# Patient Record
Sex: FEMALE | Race: WHITE | NOT HISPANIC OR LATINO | Employment: UNEMPLOYED | URBAN - METROPOLITAN AREA
[De-identification: names, ages, dates, MRNs, and addresses within clinical notes are randomized per-mention and may not be internally consistent; named-entity substitution may affect disease eponyms.]

---

## 2023-12-26 ENCOUNTER — HOSPITAL ENCOUNTER (EMERGENCY)
Facility: HOSPITAL | Age: 78
Discharge: HOME | End: 2023-12-27
Attending: EMERGENCY MEDICINE
Payer: MEDICARE

## 2023-12-26 DIAGNOSIS — M54.31 RIGHT SIDED SCIATICA: Primary | ICD-10-CM

## 2023-12-26 PROCEDURE — 99284 EMERGENCY DEPT VISIT MOD MDM: CPT

## 2023-12-27 VITALS
WEIGHT: 250 LBS | HEIGHT: 66 IN | TEMPERATURE: 98 F | BODY MASS INDEX: 40.18 KG/M2 | SYSTOLIC BLOOD PRESSURE: 169 MMHG | OXYGEN SATURATION: 95 % | HEART RATE: 95 BPM | RESPIRATION RATE: 17 BRPM | DIASTOLIC BLOOD PRESSURE: 94 MMHG

## 2023-12-27 PROCEDURE — 96372 THER/PROPH/DIAG INJ SC/IM: CPT | Performed by: PHYSICIAN ASSISTANT

## 2023-12-27 PROCEDURE — 63600175 PHARM REV CODE 636 W HCPCS: Performed by: PHYSICIAN ASSISTANT

## 2023-12-27 PROCEDURE — 25000003 PHARM REV CODE 250: Performed by: PHYSICIAN ASSISTANT

## 2023-12-27 RX ORDER — LIDOCAINE 50 MG/G
1 PATCH TOPICAL
Status: DISCONTINUED | OUTPATIENT
Start: 2023-12-27 | End: 2023-12-27 | Stop reason: HOSPADM

## 2023-12-27 RX ORDER — LIDOCAINE 50 MG/G
1 PATCH TOPICAL DAILY
Qty: 10 PATCH | Refills: 0 | Status: SHIPPED | OUTPATIENT
Start: 2023-12-27 | End: 2024-01-06

## 2023-12-27 RX ORDER — DICLOFENAC SODIUM 50 MG/1
50 TABLET, DELAYED RELEASE ORAL 3 TIMES DAILY
Qty: 21 TABLET | Refills: 0 | Status: SHIPPED | OUTPATIENT
Start: 2023-12-27 | End: 2024-01-03

## 2023-12-27 RX ORDER — KETOROLAC TROMETHAMINE 30 MG/ML
30 INJECTION, SOLUTION INTRAMUSCULAR; INTRAVENOUS
Status: COMPLETED | OUTPATIENT
Start: 2023-12-27 | End: 2023-12-27

## 2023-12-27 RX ORDER — METHYLPREDNISOLONE 4 MG/1
TABLET ORAL
Qty: 21 EACH | Refills: 0 | Status: SHIPPED | OUTPATIENT
Start: 2023-12-27 | End: 2024-01-17

## 2023-12-27 RX ADMIN — KETOROLAC TROMETHAMINE 30 MG: 30 INJECTION, SOLUTION INTRAMUSCULAR at 12:12

## 2023-12-27 RX ADMIN — LIDOCAINE 1 PATCH: 50 PATCH CUTANEOUS at 12:12

## 2023-12-27 NOTE — ED PROVIDER NOTES
"Encounter Date: 12/26/2023       History     Chief Complaint   Patient presents with    Hip Pain     Right sided hip pain that burns down leg x 1 week. Patient told by GP to come in and get MRI d/t worsening of pain. Hx of hip replacements. Patient fell x2 months ago in right hand. Patient taking prescribed hydrocodone/acetaminophen 7.5mg and 10mg. Patient was using cane prior to fall.      78-year-old female presents to ED for evaluation low back pain radiating down her right leg.  States that she has hip pain that starts in her buttock and goes down her leg.  Denies any recent trauma or injury.  Denies any saddle anesthesia loss of bowel or urinary incontinence.  Patient reports that she has been seen at multiple EGDs with multiple imaging done showing no acute findings.  Was placed on Norco and then Percocet without relief.  States that called her PCP today and advised to return to ED for possible MRI.  Patient does state that she had a fall several months ago and concerned for possible tear in her right hip.    The history is provided by the patient. No  was used.     Review of patient's allergies indicates:   Allergen Reactions    Opioids - morphine analogues Other (See Comments)     Patient advised she will become "comatose."     Past Medical History:   Diagnosis Date    Bell's palsy     Fibrocystic breast     Hypertension     Mixed hyperlipidemia     Osteoporosis     Psoriasis     Restless leg syndrome     Sleep apnea, unspecified      Past Surgical History:   Procedure Laterality Date    BREAST SURGERY      CHOLECYSTECTOMY      HYSTERECTOMY      JOINT REPLACEMENT      Left hip surgery      Right ankle fracture       History reviewed. No pertinent family history.     Review of Systems   Constitutional:  Negative for chills, fatigue and fever.   Respiratory:  Negative for shortness of breath.    Cardiovascular:  Negative for chest pain.   Gastrointestinal:  Negative for abdominal pain, " diarrhea, nausea and vomiting.   Genitourinary:  Negative for dysuria, flank pain, frequency and urgency.   Musculoskeletal:  Positive for arthralgias, back pain and myalgias.   All other systems reviewed and are negative.      Physical Exam     Initial Vitals [12/26/23 1846]   BP Pulse Resp Temp SpO2   (!) 166/68 68 18 97.8 °F (36.6 °C) 96 %      MAP       --         Physical Exam    Vitals reviewed.  Constitutional: She appears well-developed.   HENT:   Head: Normocephalic and atraumatic.   Right Ear: Tympanic membrane and external ear normal.   Left Ear: Tympanic membrane and external ear normal.   Mouth/Throat: Uvula is midline, oropharynx is clear and moist and mucous membranes are normal. No trismus in the jaw. No uvula swelling. No oropharyngeal exudate, posterior oropharyngeal edema or posterior oropharyngeal erythema.   Eyes: Conjunctivae are normal. Pupils are equal, round, and reactive to light.   Neck: Neck supple.   Normal range of motion.  Cardiovascular:  Normal rate, regular rhythm and normal heart sounds.           Pulmonary/Chest: Breath sounds normal. She has no wheezes. She has no rhonchi. She has no rales.   Abdominal: Abdomen is soft. Bowel sounds are normal. There is no abdominal tenderness.   Musculoskeletal:      Cervical back: Normal, normal range of motion and neck supple.      Thoracic back: Normal.      Lumbar back: Tenderness present. Positive right straight leg raise test.        Back:      Neurological: She is alert and oriented to person, place, and time.   Skin: Skin is warm and dry.   Psychiatric: She has a normal mood and affect.         ED Course   Procedures  Labs Reviewed - No data to display       Imaging Results    None          Medications   LIDOcaine 5 % patch 1 patch (has no administration in time range)   ketorolac injection 30 mg (30 mg Intramuscular Given 12/27/23 0050)     Medical Decision Making  78-year-old female presents to ED for evaluation low back pain radiating  down her right leg.  States that she has hip pain that starts in her buttock and goes down her leg.  Denies any recent trauma or injury.  Denies any saddle anesthesia loss of bowel or urinary incontinence.  Patient reports that she has been seen at multiple EGDs with multiple imaging done showing no acute findings.  Was placed on Norco and then Percocet without relief.  States that called her PCP today and advised to return to ED for possible MRI.  Patient does state that she had a fall several months ago and concerned for possible tear in her right hip.    Differential diagnosis includes but isn't limited to low back pain, lumbar radiculopathy, sciatica, internal injury to hit.    Amount and/or Complexity of Data Reviewed  External Data Reviewed: notes.     Details: Reviewed ED visit from 12/24/2023 from outside ED with CT lumbar negative for any acute findings.  Patient discharged home with Percocet.  Discussion of management or test interpretation with external provider(s): 78-year-old female presents to ED for evaluation right hip pain radiating down into her leg.  On exam patient has tenderness noted at the right SI joint which radiates down consistent with possible sciatica.  Patient had lumbar and pelvis CT done on 12/24/2023 at outside ED showing no acute findings.  Patient has no saddle anesthesia loss of bowel or urinary incontinence.  No new trauma or injury.  Family is requesting pain management.  Patient already on Percocet at home.  Will add an anti-inflammatory along with a Medrol Dosepak.  To help with inflammation.  Discussed follow-up with PCP for possible MRI.  Daughter reporting that PCP sent patient here to get an MRI done.  Reviewed that there is no emergent indication for patient to have emergent MRI at the ER.  Daughter verbalizes understanding.  Return ED precautions given.  Daughter and patient agree with plan of care.    Risk  OTC drugs.  Prescription drug management.  Parenteral  controlled substances.                                      Clinical Impression:  Final diagnoses:  [M54.31] Right sided sciatica (Primary)          ED Disposition Condition    Discharge Stable          ED Prescriptions       Medication Sig Dispense Start Date End Date Auth. Provider    LIDOcaine (LIDODERM) 5 % Place 1 patch onto the skin once daily. Remove & Discard patch within 12 hours or as directed by MD for 10 days 10 patch 12/27/2023 1/6/2024 Clare Wagner PA    methylPREDNISolone (MEDROL DOSEPACK) 4 mg tablet use as directed 21 each 12/27/2023 1/17/2024 Clare Wagner PA    diclofenac (VOLTAREN) 50 MG EC tablet Take 1 tablet (50 mg total) by mouth 3 (three) times daily. for 7 days 21 tablet 12/27/2023 1/3/2024 Clare Wagner PA          Follow-up Information       Follow up With Specialties Details Why Contact Info    PCP  In 1 week As needed, If you do not have a PCP you may call [phone] to help get set up If you do not have a PCP you may call [phone] to help get set up.             Clare Wagner PA  12/27/23 0058

## 2023-12-27 NOTE — DISCHARGE INSTRUCTIONS
Take full course of Medrol Dosepak.  Use lidocaine patches as needed for pain.  Take diclofenac for pain and swelling.  May use home pain medication as needed.  Follow up with PCP in 5-7 days.

## 2025-02-10 ENCOUNTER — LAB REQUISITION (OUTPATIENT)
Dept: LAB | Facility: HOSPITAL | Age: 80
End: 2025-02-10
Payer: MEDICARE

## 2025-02-10 DIAGNOSIS — Z79.1 LONG TERM (CURRENT) USE OF NON-STEROIDAL ANTI-INFLAMMATORIES (NSAID): ICD-10-CM

## 2025-02-10 DIAGNOSIS — K92.1 MELENA: ICD-10-CM

## 2025-02-10 DIAGNOSIS — F03.90 UNSPECIFIED DEMENTIA, UNSPECIFIED SEVERITY, WITHOUT BEHAVIORAL DISTURBANCE, PSYCHOTIC DISTURBANCE, MOOD DISTURBANCE, AND ANXIETY: ICD-10-CM

## 2025-02-10 DIAGNOSIS — G47.33 OBSTRUCTIVE SLEEP APNEA (ADULT) (PEDIATRIC): ICD-10-CM

## 2025-02-10 LAB
BACTERIA #/AREA URNS AUTO: ABNORMAL /HPF
BILIRUB UR QL STRIP.AUTO: NEGATIVE
CLARITY UR: CLEAR
COLOR UR AUTO: COLORLESS
GLUCOSE UR QL STRIP: NORMAL
HGB UR QL STRIP: NEGATIVE
HYALINE CASTS #/AREA URNS LPF: ABNORMAL /LPF
KETONES UR QL STRIP: NEGATIVE
LEUKOCYTE ESTERASE UR QL STRIP: NEGATIVE
MUCOUS THREADS URNS QL MICRO: ABNORMAL /LPF
NITRITE UR QL STRIP: NEGATIVE
PH UR STRIP: 5 [PH]
PROT UR QL STRIP: NEGATIVE
RBC #/AREA URNS AUTO: ABNORMAL /HPF
SP GR UR STRIP.AUTO: 1.01 (ref 1–1.03)
SQUAMOUS #/AREA URNS LPF: ABNORMAL /HPF
UROBILINOGEN UR STRIP-ACNC: NORMAL
WBC #/AREA URNS AUTO: ABNORMAL /HPF

## 2025-02-10 PROCEDURE — 81001 URINALYSIS AUTO W/SCOPE: CPT | Performed by: SPECIALIST

## 2025-02-10 PROCEDURE — 87086 URINE CULTURE/COLONY COUNT: CPT | Performed by: SPECIALIST

## 2025-02-12 LAB — BACTERIA UR CULT: NO GROWTH
